# Patient Record
Sex: FEMALE | Race: WHITE
[De-identification: names, ages, dates, MRNs, and addresses within clinical notes are randomized per-mention and may not be internally consistent; named-entity substitution may affect disease eponyms.]

---

## 2018-01-16 ENCOUNTER — HOSPITAL ENCOUNTER (OUTPATIENT)
Dept: HOSPITAL 47 - RADMAMWWP | Age: 53
Discharge: HOME | End: 2018-01-16
Payer: COMMERCIAL

## 2018-01-16 DIAGNOSIS — Z12.31: Primary | ICD-10-CM

## 2018-01-16 PROCEDURE — 77063 BREAST TOMOSYNTHESIS BI: CPT

## 2018-01-16 PROCEDURE — 77067 SCR MAMMO BI INCL CAD: CPT

## 2018-01-17 NOTE — MM
Reason for exam: screening  (asymptomatic).

Last mammogram was performed 4 years and 4 months ago.



History:

Family history of breast cancer in aunt.



Physical Findings:

A clinical breast exam by your physician is recommended on an annual basis and 

results should be correlated with mammographic findings.



MG 3D Screening Mammo W/Cad

Bilateral CC and MLO view(s) were taken.

Prior study comparison: September 12, 2013, bilateral digital screening mammo 

w/CAD.

There are scattered fibroglandular densities.  There is a benign appearing 

circumscribed upper outer quadrant mass on the left breast stable back to 2013.





ASSESSMENT: Benign, BI-RAD 2



RECOMMENDATION:

Routine screening mammogram of both breasts in 1 year.

## 2018-03-01 ENCOUNTER — HOSPITAL ENCOUNTER (EMERGENCY)
Dept: HOSPITAL 47 - EC | Age: 53
Discharge: HOME | End: 2018-03-01
Payer: COMMERCIAL

## 2018-03-01 VITALS
DIASTOLIC BLOOD PRESSURE: 81 MMHG | SYSTOLIC BLOOD PRESSURE: 116 MMHG | RESPIRATION RATE: 18 BRPM | HEART RATE: 74 BPM | TEMPERATURE: 97.8 F

## 2018-03-01 DIAGNOSIS — Z79.4: ICD-10-CM

## 2018-03-01 DIAGNOSIS — I10: ICD-10-CM

## 2018-03-01 DIAGNOSIS — Z79.82: ICD-10-CM

## 2018-03-01 DIAGNOSIS — Z88.1: ICD-10-CM

## 2018-03-01 DIAGNOSIS — F17.200: ICD-10-CM

## 2018-03-01 DIAGNOSIS — R06.02: Primary | ICD-10-CM

## 2018-03-01 DIAGNOSIS — Z79.899: ICD-10-CM

## 2018-03-01 DIAGNOSIS — E11.9: ICD-10-CM

## 2018-03-01 DIAGNOSIS — Z88.0: ICD-10-CM

## 2018-03-01 DIAGNOSIS — E78.5: ICD-10-CM

## 2018-03-01 LAB
ALBUMIN SERPL-MCNC: 4.3 G/DL (ref 3.5–5)
ALP SERPL-CCNC: 93 U/L (ref 38–126)
ALT SERPL-CCNC: 22 U/L (ref 9–52)
ANION GAP SERPL CALC-SCNC: 14 MMOL/L
APTT BLD: 22.1 SEC (ref 22–30)
AST SERPL-CCNC: 17 U/L (ref 14–36)
BASOPHILS # BLD AUTO: 0.1 K/UL (ref 0–0.2)
BASOPHILS NFR BLD AUTO: 1 %
BUN SERPL-SCNC: 15 MG/DL (ref 7–17)
CALCIUM SPEC-MCNC: 10.3 MG/DL (ref 8.4–10.2)
CHLORIDE SERPL-SCNC: 107 MMOL/L (ref 98–107)
CK SERPL-CCNC: 61 U/L (ref 30–135)
CO2 SERPL-SCNC: 21 MMOL/L (ref 22–30)
D DIMER PPP FEU-MCNC: <0.17 MG/L FEU (ref ?–0.6)
EOSINOPHIL # BLD AUTO: 0.3 K/UL (ref 0–0.7)
EOSINOPHIL NFR BLD AUTO: 3 %
ERYTHROCYTE [DISTWIDTH] IN BLOOD BY AUTOMATED COUNT: 4.51 M/UL (ref 3.8–5.4)
ERYTHROCYTE [DISTWIDTH] IN BLOOD: 15.4 % (ref 11.5–15.5)
GLUCOSE SERPL-MCNC: 152 MG/DL (ref 74–99)
HCT VFR BLD AUTO: 39.2 % (ref 34–46)
HGB BLD-MCNC: 12.1 GM/DL (ref 11.4–16)
INR PPP: 1 (ref ?–1.2)
LYMPHOCYTES # SPEC AUTO: 2.6 K/UL (ref 1–4.8)
LYMPHOCYTES NFR SPEC AUTO: 32 %
MCH RBC QN AUTO: 26.9 PG (ref 25–35)
MCHC RBC AUTO-ENTMCNC: 30.9 G/DL (ref 31–37)
MCV RBC AUTO: 87 FL (ref 80–100)
MONOCYTES # BLD AUTO: 0.3 K/UL (ref 0–1)
MONOCYTES NFR BLD AUTO: 4 %
NEUTROPHILS # BLD AUTO: 4.6 K/UL (ref 1.3–7.7)
NEUTROPHILS NFR BLD AUTO: 58 %
PLATELET # BLD AUTO: 377 K/UL (ref 150–450)
POTASSIUM SERPL-SCNC: 3.9 MMOL/L (ref 3.5–5.1)
PROT SERPL-MCNC: 7 G/DL (ref 6.3–8.2)
PT BLD: 9.5 SEC (ref 9–12)
SODIUM SERPL-SCNC: 142 MMOL/L (ref 137–145)
T4 FREE SERPL-MCNC: 1.19 NG/DL (ref 0.78–2.19)
TROPONIN I SERPL-MCNC: <0.012 NG/ML (ref 0–0.03)
WBC # BLD AUTO: 8.1 K/UL (ref 3.8–10.6)

## 2018-03-01 PROCEDURE — 85025 COMPLETE CBC W/AUTO DIFF WBC: CPT

## 2018-03-01 PROCEDURE — 96361 HYDRATE IV INFUSION ADD-ON: CPT

## 2018-03-01 PROCEDURE — 84443 ASSAY THYROID STIM HORMONE: CPT

## 2018-03-01 PROCEDURE — 96374 THER/PROPH/DIAG INJ IV PUSH: CPT

## 2018-03-01 PROCEDURE — 82550 ASSAY OF CK (CPK): CPT

## 2018-03-01 PROCEDURE — 83880 ASSAY OF NATRIURETIC PEPTIDE: CPT

## 2018-03-01 PROCEDURE — 83735 ASSAY OF MAGNESIUM: CPT

## 2018-03-01 PROCEDURE — 99285 EMERGENCY DEPT VISIT HI MDM: CPT

## 2018-03-01 PROCEDURE — 82553 CREATINE MB FRACTION: CPT

## 2018-03-01 PROCEDURE — 85610 PROTHROMBIN TIME: CPT

## 2018-03-01 PROCEDURE — 84439 ASSAY OF FREE THYROXINE: CPT

## 2018-03-01 PROCEDURE — 85730 THROMBOPLASTIN TIME PARTIAL: CPT

## 2018-03-01 PROCEDURE — 85379 FIBRIN DEGRADATION QUANT: CPT

## 2018-03-01 PROCEDURE — 84481 FREE ASSAY (FT-3): CPT

## 2018-03-01 PROCEDURE — 93005 ELECTROCARDIOGRAM TRACING: CPT

## 2018-03-01 PROCEDURE — 80053 COMPREHEN METABOLIC PANEL: CPT

## 2018-03-01 PROCEDURE — 71046 X-RAY EXAM CHEST 2 VIEWS: CPT

## 2018-03-01 PROCEDURE — 36415 COLL VENOUS BLD VENIPUNCTURE: CPT

## 2018-03-01 PROCEDURE — 84484 ASSAY OF TROPONIN QUANT: CPT

## 2018-03-01 NOTE — XR
EXAMINATION TYPE: XR chest 2V

 

DATE OF EXAM: 3/1/2018

 

COMPARISON: NONE

 

HISTORY: General malaise with some shortness of breath.

 

TECHNIQUE:  Frontal and lateral views of the chest are obtained.

 

FINDINGS:  There is no focal air space opacity, pleural effusion, or pneumothorax seen.  The cardiac 
silhouette size is within normal limits.   The osseous structures are intact.

 

IMPRESSION:  No acute cardiopulmonary process.

## 2018-03-01 NOTE — ED
General Adult HPI





- General


Chief complaint: Recheck/Abnormal Lab/Rx


Stated complaint: Abnormal EKG


Time Seen by Provider: 18 09:54


Source: patient, RN notes reviewed


Mode of arrival: ambulatory


Limitations: no limitations





- History of Present Illness


Initial comments: 





Patient is a pleasant 52-year-old female presenting to the emergency department 

not feeling well.  Patient admits to having a stressful day yesterday.  Patient 

did not sleep well through the night.  Patient has felt off since this morning.

  Patient did feel a little bit short of breath earlier today.  No chest pain.  

Patient saw her doctor prior to arrival who recommended she come to the 

emergency department for further evaluation regarding electrolytes and cardiac 

evaluation.





- Related Data


 Home Medications











 Medication  Instructions  Recorded  Confirmed


 


Aspirin EC [Ecotrin Low Dose] 81 mg PO DAILY 18


 


Calcium + D3 1 tab PO DAILY 18


 


Cholecalciferol [Vitamin D3] 1,000 unit PO DAILY 18


 


Empagliflozin/Linagliptin 1 tab PO DAILY 18





[Glyxambi 25 mg-5 mg Tablet]   


 


Famotidine [Pepcid] 20 mg PO DAILY 18


 


INSULIN LISPRO (humaLOG) [humaLOG] See Protocol SQ ACHS 18


 


Insulin NPH Human Isophane 4 - 8 unit SQ HS PRN 18





[humuLIN N]   


 


Losartan Potassium [Cozaar] 25 mg PO DAILY 18


 


Oxybutynin Chloride [Oxybutynin 10 mg PO DAILY 18





Chloride ER]   


 


Pravastatin Sodium [Pravachol] 40 mg PO DAILY 18


 


metFORMIN HCL ER [Glucophage Xr] 100 mg PO HS 18


 


metFORMIN HCL ER [Glucophage Xr] 500 mg PO DAILY 18








 Previous Rx's











 Medication  Instructions  Recorded


 


LORazepam [Ativan] 1 mg PO TID PRN #8 tab 18











 Allergies











Allergy/AdvReac Type Severity Reaction Status Date / Time


 


erythromycin base Allergy  Rash/Hives Verified 18 11:21


 


Penicillins Allergy  Rash/Hives Verified 18 11:21














Review of Systems


ROS Statement: 


Those systems with pertinent positive or pertinent negative responses have been 

documented in the HPI.





ROS Other: All systems not noted in ROS Statement are negative.


Constitutional: Denies: fever


Eyes: Denies: eye pain


ENT: Denies: ear pain


Respiratory: Reports: dyspnea (Resolved).  Denies: cough


Cardiovascular: Denies: chest pain


Endocrine: Denies: fatigue


Gastrointestinal: Denies: abdominal pain


Genitourinary: Denies: dysuria


Musculoskeletal: Denies: back pain


Skin: Denies: rash


Neurological: Denies: headache


Psychiatric: Reports: anxiety





Past Medical History


Past Medical History: Diabetes Mellitus, Hyperlipidemia, Hypertension


Additional Past Medical History / Comment(s): Calcium.


History of Any Multi-Drug Resistant Organisms: None Reported


Past Surgical History: Appendectomy,  Section, Tubal Ligation


Additional Past Surgical History / Comment(s): Cyst.


Past Psychological History: Anxiety


Smoking Status: Current some day smoker


Past Alcohol Use History: Daily


Past Drug Use History: None Reported





General Exam


Limitations: no limitations


General appearance: alert, in no apparent distress


Head exam: Present: atraumatic


Eye exam: Present: normal appearance, PERRL


ENT exam: Present: normal oropharynx


Neck exam: Present: normal inspection


Respiratory exam: Present: normal lung sounds bilaterally


Cardiovascular Exam: Present: regular rate, normal rhythm


GI/Abdominal exam: Present: soft.  Absent: tenderness


Extremities exam: Present: normal inspection.  Absent: pedal edema, calf 

tenderness


Neurological exam: Present: alert, oriented X3, CN II-XII intact.  Absent: 

motor sensory deficit


  ** Expanded


Motor strength exam: RUE: 5, LUE: 5, RLE: 5, LLE: 5


Psychiatric exam: Present: normal affect, normal mood


Skin exam: Present: normal color





Course


 Vital Signs











  18





  09:48 11:01 12:40


 


Temperature 98 F  


 


Pulse Rate 68 58 L 82


 


Respiratory 20 16 16





Rate   


 


Blood Pressure 181/87 121/78 120/75


 


O2 Sat by Pulse 99 99 100





Oximetry   














EKG Findings





- EKG Comments:


EKG Findings:: Normal sinus rhythm 62.  .  QRS 88.  .  .  

Normal axis.  Normal QRS.  No acute ST change.





Medical Decision Making





- Medical Decision Making





Patient reevaluated and resting comfortably in bed.  Patient symptom-free 

following Ativan.  Case was discussed in detail with Dr. Le who is 

comfortable with discharge home and will follow-up with his patient tomorrow or 

Monday.  Patient is updated regarding this and is comfortable with discharge.  

Patient is receptive to prescription of Ativan.





- Lab Data


Result diagrams: 


 18 10:10





 18 10:10


 Lab Results











  18 Range/Units





  10:10 10:10 10:10 


 


WBC   8.1   (3.8-10.6)  k/uL


 


RBC   4.51   (3.80-5.40)  m/uL


 


Hgb   12.1   (11.4-16.0)  gm/dL


 


Hct   39.2   (34.0-46.0)  %


 


MCV   87.0   (80.0-100.0)  fL


 


MCH   26.9   (25.0-35.0)  pg


 


MCHC   30.9 L   (31.0-37.0)  g/dL


 


RDW   15.4   (11.5-15.5)  %


 


Plt Count   377   (150-450)  k/uL


 


Neutrophils %   58   %


 


Lymphocytes %   32   %


 


Monocytes %   4   %


 


Eosinophils %   3   %


 


Basophils %   1   %


 


Neutrophils #   4.6   (1.3-7.7)  k/uL


 


Lymphocytes #   2.6   (1.0-4.8)  k/uL


 


Monocytes #   0.3   (0-1.0)  k/uL


 


Eosinophils #   0.3   (0-0.7)  k/uL


 


Basophils #   0.1   (0-0.2)  k/uL


 


Hypochromasia   Slight   


 


PT     (9.0-12.0)  sec


 


INR     (<1.2)  


 


APTT     (22.0-30.0)  sec


 


D-Dimer     (<0.60)  mg/L FEU


 


Sodium    142  (137-145)  mmol/L


 


Potassium    3.9  (3.5-5.1)  mmol/L


 


Chloride    107  ()  mmol/L


 


Carbon Dioxide    21 L  (22-30)  mmol/L


 


Anion Gap    14  mmol/L


 


BUN    15  (7-17)  mg/dL


 


Creatinine    0.60  (0.52-1.04)  mg/dL


 


Est GFR (MDRD) Af Amer    >60  (>60 ml/min/1.73 sqM)  


 


Est GFR (MDRD) Non-Af    >60  (>60 ml/min/1.73 sqM)  


 


Glucose    152 H  (74-99)  mg/dL


 


Calcium    10.3 H  (8.4-10.2)  mg/dL


 


Magnesium    1.8  (1.6-2.3)  mg/dL


 


Total Bilirubin    0.2  (0.2-1.3)  mg/dL


 


AST    17  (14-36)  U/L


 


ALT    22  (9-52)  U/L


 


Alkaline Phosphatase    93  ()  U/L


 


Total Creatine Kinase  61    ()  U/L


 


CK-MB (CK-2)  0.4    (0.0-2.4)  ng/mL


 


CK-MB (CK-2) Rel Index  0.7    


 


Troponin I  <0.012    (0.000-0.034)  ng/mL


 


NT-Pro-B Natriuret Pep     pg/mL


 


Total Protein    7.0  (6.3-8.2)  g/dL


 


Albumin    4.3  (3.5-5.0)  g/dL


 


TSH    0.916  (0.465-4.680)  mIU/L


 


Free T4    1.19  (0.78-2.19)  ng/dL


 


Free T3 pg/mL    4.0  (2.8-5.3)  pg/ml














  18 Range/Units





  10:10 10:10 


 


WBC    (3.8-10.6)  k/uL


 


RBC    (3.80-5.40)  m/uL


 


Hgb    (11.4-16.0)  gm/dL


 


Hct    (34.0-46.0)  %


 


MCV    (80.0-100.0)  fL


 


MCH    (25.0-35.0)  pg


 


MCHC    (31.0-37.0)  g/dL


 


RDW    (11.5-15.5)  %


 


Plt Count    (150-450)  k/uL


 


Neutrophils %    %


 


Lymphocytes %    %


 


Monocytes %    %


 


Eosinophils %    %


 


Basophils %    %


 


Neutrophils #    (1.3-7.7)  k/uL


 


Lymphocytes #    (1.0-4.8)  k/uL


 


Monocytes #    (0-1.0)  k/uL


 


Eosinophils #    (0-0.7)  k/uL


 


Basophils #    (0-0.2)  k/uL


 


Hypochromasia    


 


PT  9.5   (9.0-12.0)  sec


 


INR  1.0   (<1.2)  


 


APTT  22.1   (22.0-30.0)  sec


 


D-Dimer  <0.17   (<0.60)  mg/L FEU


 


Sodium    (137-145)  mmol/L


 


Potassium    (3.5-5.1)  mmol/L


 


Chloride    ()  mmol/L


 


Carbon Dioxide    (22-30)  mmol/L


 


Anion Gap    mmol/L


 


BUN    (7-17)  mg/dL


 


Creatinine    (0.52-1.04)  mg/dL


 


Est GFR (MDRD) Af Amer    (>60 ml/min/1.73 sqM)  


 


Est GFR (MDRD) Non-Af    (>60 ml/min/1.73 sqM)  


 


Glucose    (74-99)  mg/dL


 


Calcium    (8.4-10.2)  mg/dL


 


Magnesium    (1.6-2.3)  mg/dL


 


Total Bilirubin    (0.2-1.3)  mg/dL


 


AST    (14-36)  U/L


 


ALT    (9-52)  U/L


 


Alkaline Phosphatase    ()  U/L


 


Total Creatine Kinase    ()  U/L


 


CK-MB (CK-2)    (0.0-2.4)  ng/mL


 


CK-MB (CK-2) Rel Index    


 


Troponin I    (0.000-0.034)  ng/mL


 


NT-Pro-B Natriuret Pep   28  pg/mL


 


Total Protein    (6.3-8.2)  g/dL


 


Albumin    (3.5-5.0)  g/dL


 


TSH    (0.465-4.680)  mIU/L


 


Free T4    (0.78-2.19)  ng/dL


 


Free T3 pg/mL    (2.8-5.3)  pg/ml














- Radiology Data


Radiology results: image reviewed (Chest x-ray shows no acute process.)





Disposition


Clinical Impression: 


 Dyspnea





Disposition: HOME SELF-CARE


Condition: Stable


Instructions:  Dyspnea (ED), Anxiety (ED)


Additional Instructions: 


Please follow-up with Dr. Le either tomorrow or Monday.  Return for chest 

pain, difficulty breathing, worsening or changing symptoms or other concerns.


Prescriptions: 


LORazepam [Ativan] 1 mg PO TID PRN #8 tab


 PRN Reason: Anxiety


Referrals: 


Collin Aaron Jr,  [Primary Care Provider] - 1-2 days


Time of Disposition: 12:58

## 2018-06-15 ENCOUNTER — HOSPITAL ENCOUNTER (OUTPATIENT)
Dept: HOSPITAL 47 - LABWHC1 | Age: 53
Discharge: HOME | End: 2018-06-15
Payer: COMMERCIAL

## 2018-06-15 DIAGNOSIS — E10.65: Primary | ICD-10-CM

## 2018-06-15 LAB
ALBUMIN SERPL-MCNC: 4.2 G/DL (ref 3.5–5)
ALP SERPL-CCNC: 87 U/L (ref 38–126)
ALT SERPL-CCNC: 25 U/L (ref 9–52)
ANION GAP SERPL CALC-SCNC: 10 MMOL/L
AST SERPL-CCNC: 15 U/L (ref 14–36)
BUN SERPL-SCNC: 13 MG/DL (ref 7–17)
CALCIUM SPEC-MCNC: 9.3 MG/DL (ref 8.4–10.2)
CHLORIDE SERPL-SCNC: 102 MMOL/L (ref 98–107)
CHOLEST SERPL-MCNC: 171 MG/DL (ref ?–200)
CO2 SERPL-SCNC: 27 MMOL/L (ref 22–30)
GLUCOSE SERPL-MCNC: 160 MG/DL (ref 74–99)
HBA1C MFR BLD: 7.5 % (ref 4–6)
HDLC SERPL-MCNC: 112 MG/DL (ref 40–60)
LDLC SERPL CALC-MCNC: 41 MG/DL (ref 0–99)
POTASSIUM SERPL-SCNC: 4.4 MMOL/L (ref 3.5–5.1)
PROT SERPL-MCNC: 6.6 G/DL (ref 6.3–8.2)
SODIUM SERPL-SCNC: 139 MMOL/L (ref 137–145)
TRIGL SERPL-MCNC: 89 MG/DL (ref ?–150)

## 2018-06-15 PROCEDURE — 36415 COLL VENOUS BLD VENIPUNCTURE: CPT

## 2018-06-15 PROCEDURE — 83036 HEMOGLOBIN GLYCOSYLATED A1C: CPT

## 2018-06-15 PROCEDURE — 82043 UR ALBUMIN QUANTITATIVE: CPT

## 2018-06-15 PROCEDURE — 84443 ASSAY THYROID STIM HORMONE: CPT

## 2018-06-15 PROCEDURE — 80061 LIPID PANEL: CPT

## 2018-06-15 PROCEDURE — 80053 COMPREHEN METABOLIC PANEL: CPT

## 2018-06-15 PROCEDURE — 82570 ASSAY OF URINE CREATININE: CPT

## 2019-02-15 ENCOUNTER — HOSPITAL ENCOUNTER (OUTPATIENT)
Dept: HOSPITAL 47 - LABWHC1 | Age: 54
Discharge: HOME | End: 2019-02-15
Attending: NURSE PRACTITIONER
Payer: COMMERCIAL

## 2019-02-15 DIAGNOSIS — Z79.899: ICD-10-CM

## 2019-02-15 DIAGNOSIS — E11.9: Primary | ICD-10-CM

## 2019-02-15 DIAGNOSIS — I10: ICD-10-CM

## 2019-02-15 DIAGNOSIS — Z79.4: ICD-10-CM

## 2019-02-15 DIAGNOSIS — E78.00: ICD-10-CM

## 2019-02-15 LAB
ALBUMIN SERPL-MCNC: 4.5 G/DL (ref 3.8–4.9)
ALBUMIN/GLOB SERPL: 2.14 G/DL (ref 1.6–3.17)
ALP SERPL-CCNC: 100 U/L (ref 41–126)
ALT SERPL-CCNC: 19 U/L (ref 8–44)
ANION GAP SERPL CALC-SCNC: 8 MMOL/L (ref 4–12)
AST SERPL-CCNC: 18 U/L (ref 13–35)
BASOPHILS # BLD AUTO: 0.1 K/UL (ref 0–0.2)
BASOPHILS NFR BLD AUTO: 1 %
BUN SERPL-SCNC: 21 MG/DL (ref 9–27)
CALCIUM SPEC-MCNC: 9.9 MG/DL (ref 8.7–10.3)
CHLORIDE SERPL-SCNC: 107 MMOL/L (ref 96–109)
CHOLEST SERPL-MCNC: 201 MG/DL (ref 0–200)
CO2 SERPL-SCNC: 27 MMOL/L (ref 21.6–31.8)
EOSINOPHIL # BLD AUTO: 0.3 K/UL (ref 0–0.7)
EOSINOPHIL NFR BLD AUTO: 5 %
ERYTHROCYTE [DISTWIDTH] IN BLOOD BY AUTOMATED COUNT: 4.57 M/UL (ref 3.8–5.4)
ERYTHROCYTE [DISTWIDTH] IN BLOOD: 14.5 % (ref 11.5–15.5)
GLOBULIN SER CALC-MCNC: 2.1 G/DL (ref 1.6–3.3)
GLUCOSE SERPL-MCNC: 169 MG/DL (ref 70–110)
HCT VFR BLD AUTO: 43.1 % (ref 34–46)
HDLC SERPL-MCNC: 117 MG/DL (ref 40–60)
HGB BLD-MCNC: 13.6 GM/DL (ref 11.4–16)
LDLC SERPL CALC-MCNC: 74 MG/DL (ref 0–131)
LYMPHOCYTES # SPEC AUTO: 1.8 K/UL (ref 1–4.8)
LYMPHOCYTES NFR SPEC AUTO: 28 %
MCH RBC QN AUTO: 29.8 PG (ref 25–35)
MCHC RBC AUTO-ENTMCNC: 31.6 G/DL (ref 31–37)
MCV RBC AUTO: 94.2 FL (ref 80–100)
MONOCYTES # BLD AUTO: 0.3 K/UL (ref 0–1)
MONOCYTES NFR BLD AUTO: 5 %
NEUTROPHILS # BLD AUTO: 4 K/UL (ref 1.3–7.7)
NEUTROPHILS NFR BLD AUTO: 61 %
PLATELET # BLD AUTO: 282 K/UL (ref 150–450)
POTASSIUM SERPL-SCNC: 4.9 MMOL/L (ref 3.5–5.5)
PROT SERPL-MCNC: 6.6 G/DL (ref 6.2–8.2)
SODIUM SERPL-SCNC: 142 MMOL/L (ref 135–145)
T4 FREE SERPL-MCNC: 1.1 NG/DL (ref 0.8–1.8)
TRIGL SERPL-MCNC: <50 MG/DL (ref 0–149)
VLDLC SERPL CALC-MCNC: 9.98 MG/DL (ref 5–40)
WBC # BLD AUTO: 6.5 K/UL (ref 3.8–10.6)

## 2019-02-15 PROCEDURE — 84681 ASSAY OF C-PEPTIDE: CPT

## 2019-02-15 PROCEDURE — 85025 COMPLETE CBC W/AUTO DIFF WBC: CPT

## 2019-02-15 PROCEDURE — 84439 ASSAY OF FREE THYROXINE: CPT

## 2019-02-15 PROCEDURE — 80061 LIPID PANEL: CPT

## 2019-02-15 PROCEDURE — 84443 ASSAY THYROID STIM HORMONE: CPT

## 2019-02-15 PROCEDURE — 36415 COLL VENOUS BLD VENIPUNCTURE: CPT

## 2019-02-15 PROCEDURE — 80053 COMPREHEN METABOLIC PANEL: CPT

## 2023-10-04 ENCOUNTER — HOSPITAL ENCOUNTER (OUTPATIENT)
Dept: HOSPITAL 47 - RADMAMWWP | Age: 58
Discharge: HOME | End: 2023-10-04
Attending: FAMILY MEDICINE
Payer: COMMERCIAL

## 2023-10-04 DIAGNOSIS — Z80.3: ICD-10-CM

## 2023-10-04 DIAGNOSIS — N64.4: Primary | ICD-10-CM

## 2023-10-04 DIAGNOSIS — Z78.0: ICD-10-CM

## 2023-10-04 PROCEDURE — 77066 DX MAMMO INCL CAD BI: CPT

## 2023-10-04 PROCEDURE — 77062 BREAST TOMOSYNTHESIS BI: CPT

## 2023-10-04 NOTE — MM
Reason for Exam: Clinical finding. 

Last mammogram was performed 4 year(s) and 7 month(s) ago. 





Patient History: 

Menarche at age 12. First Full-Term Pregnancy at age 28. Postmenopausal.

Maternal aunt had breast cancer. 





Risk Values: 

Deandra 5 year model risk: 1.5%.

NCI Lifetime model risk: 8.5%.





Prior Study Comparison: 

2/17/2004 Bilateral Screening Mammogram, [facility]. 2/9/2007 Bilateral Screening Mammogram, [facility]. 2/14/2007

Left Diagnostic Mammogram, [facility]. 3/5/2008 Bilateral Screening Mammogram, [facility]. 9/12/2013 Bilateral

Screening Mammogram, [facility]. 1/16/2018 Bilateral Screening Mammogram, [facility]. 





Tissue Density: 

There are scattered fibroglandular densities.





Findings: 

Analyzed By CAD. 

No suspicious mass, architectural torsion, or pleural microcalcifications within either breast.



No suspicious mass, architectural distortion, or group of microcalcifications within either breast. 





Overall Assessment: Benign, BI-RAD 2





Management: 

Screening Mammogram of both breasts in 1 year.

A clinical breast exam by your physician is recommended on an annual basis and results should be

correlated with mammographic findings.  This exam should not preclude additional follow-up of

suspicious palpable abnormalities.  Results were given to the patient verbally at the time of exam.



Note on Deandra scores and lifetime risk:

1. A Deandra score greater than 3% is considered moderate risk. If this is the case, consider

specialist referral to assess eligibility for a risk reducing agent.

If overall lifetime risk for the development of breast cancer is 20% or higher, the patient may

qualify for future screening with alternating mammogram and breast MRI.



Electronically signed and approved by: Akbar Mejia D.O.

## 2025-06-12 ENCOUNTER — HOSPITAL ENCOUNTER (OUTPATIENT)
Dept: HOSPITAL 47 - RADUSWWP | Age: 60
Discharge: HOME | End: 2025-06-12
Attending: FAMILY MEDICINE
Payer: COMMERCIAL

## 2025-06-12 DIAGNOSIS — E78.5: ICD-10-CM

## 2025-06-12 DIAGNOSIS — I83.813: Primary | ICD-10-CM

## 2025-06-12 DIAGNOSIS — E11.51: ICD-10-CM

## 2025-06-12 DIAGNOSIS — I10: ICD-10-CM

## 2025-06-12 PROCEDURE — 93922 UPR/L XTREMITY ART 2 LEVELS: CPT

## 2025-07-29 ENCOUNTER — HOSPITAL ENCOUNTER (OUTPATIENT)
Dept: HOSPITAL 47 - ORWHC2ENDO | Age: 60
Discharge: HOME | End: 2025-07-29
Attending: SURGERY
Payer: COMMERCIAL

## 2025-07-29 VITALS — TEMPERATURE: 98.1 F

## 2025-07-29 VITALS — BODY MASS INDEX: 30.8 KG/M2

## 2025-07-29 VITALS — SYSTOLIC BLOOD PRESSURE: 128 MMHG | RESPIRATION RATE: 14 BRPM | HEART RATE: 81 BPM | DIASTOLIC BLOOD PRESSURE: 90 MMHG

## 2025-07-29 DIAGNOSIS — I10: ICD-10-CM

## 2025-07-29 DIAGNOSIS — K21.9: ICD-10-CM

## 2025-07-29 DIAGNOSIS — Z79.84: ICD-10-CM

## 2025-07-29 DIAGNOSIS — Z88.1: ICD-10-CM

## 2025-07-29 DIAGNOSIS — Z79.82: ICD-10-CM

## 2025-07-29 DIAGNOSIS — E11.9: ICD-10-CM

## 2025-07-29 DIAGNOSIS — Z79.899: ICD-10-CM

## 2025-07-29 DIAGNOSIS — Z80.0: ICD-10-CM

## 2025-07-29 DIAGNOSIS — Z90.49: ICD-10-CM

## 2025-07-29 DIAGNOSIS — Z79.4: ICD-10-CM

## 2025-07-29 DIAGNOSIS — Z98.51: ICD-10-CM

## 2025-07-29 DIAGNOSIS — E78.5: ICD-10-CM

## 2025-07-29 DIAGNOSIS — F12.90: ICD-10-CM

## 2025-07-29 DIAGNOSIS — Z88.0: ICD-10-CM

## 2025-07-29 DIAGNOSIS — L30.9: ICD-10-CM

## 2025-07-29 DIAGNOSIS — Z12.11: Primary | ICD-10-CM

## 2025-07-29 DIAGNOSIS — F17.210: ICD-10-CM

## 2025-07-29 LAB
GLUCOSE BLD-MCNC: 115 MG/DL (ref 70–110)
GLUCOSE BLD-MCNC: 169 MG/DL (ref 70–110)
GLUCOSE BLD-MCNC: 69 MG/DL (ref 70–110)

## 2025-07-29 PROCEDURE — 45378 DIAGNOSTIC COLONOSCOPY: CPT

## 2025-07-29 RX ADMIN — POTASSIUM CHLORIDE ONE MLS: 14.9 INJECTION, SOLUTION INTRAVENOUS at 11:32

## 2025-07-29 RX ADMIN — DEXTROSE MONOHYDRATE STA ML: 25 INJECTION, SOLUTION INTRAVENOUS at 10:59

## 2025-07-29 RX ADMIN — POTASSIUM CHLORIDE ONE MLS: 14.9 INJECTION, SOLUTION INTRAVENOUS at 10:58

## 2025-07-29 RX ADMIN — POTASSIUM CHLORIDE SCH MLS/HR: 14.9 INJECTION, SOLUTION INTRAVENOUS at 10:57
